# Patient Record
Sex: FEMALE | Race: WHITE | HISPANIC OR LATINO
[De-identification: names, ages, dates, MRNs, and addresses within clinical notes are randomized per-mention and may not be internally consistent; named-entity substitution may affect disease eponyms.]

---

## 2018-08-07 DIAGNOSIS — Z80.41 FAMILY HISTORY OF MALIGNANT NEOPLASM OF OVARY: ICD-10-CM

## 2018-08-07 DIAGNOSIS — N83.209 UNSPECIFIED OVARIAN CYST, UNSPECIFIED SIDE: ICD-10-CM

## 2018-08-07 DIAGNOSIS — Z81.8 FAMILY HISTORY OF OTHER MENTAL AND BEHAVIORAL DISORDERS: ICD-10-CM

## 2018-08-07 DIAGNOSIS — Z80.1 FAMILY HISTORY OF MALIGNANT NEOPLASM OF TRACHEA, BRONCHUS AND LUNG: ICD-10-CM

## 2018-08-07 DIAGNOSIS — Z80.0 FAMILY HISTORY OF MALIGNANT NEOPLASM OF DIGESTIVE ORGANS: ICD-10-CM

## 2018-08-07 DIAGNOSIS — Z80.3 FAMILY HISTORY OF MALIGNANT NEOPLASM OF BREAST: ICD-10-CM

## 2018-08-07 DIAGNOSIS — Z82.5 FAMILY HISTORY OF ASTHMA AND OTHER CHRONIC LOWER RESPIRATORY DISEASES: ICD-10-CM

## 2018-08-07 RX ORDER — DEXTRAN 70/HYPROMELLOSE 0.1%-0.3%
0.1-0.3 DROPS OPHTHALMIC (EYE)
Refills: 0 | Status: ACTIVE | COMMUNITY
Start: 2018-08-07

## 2018-09-04 ENCOUNTER — APPOINTMENT (OUTPATIENT)
Dept: INTERNAL MEDICINE | Facility: CLINIC | Age: 38
End: 2018-09-04

## 2018-11-27 ENCOUNTER — APPOINTMENT (OUTPATIENT)
Dept: INTERNAL MEDICINE | Facility: CLINIC | Age: 38
End: 2018-11-27
Payer: COMMERCIAL

## 2018-11-27 VITALS
DIASTOLIC BLOOD PRESSURE: 78 MMHG | BODY MASS INDEX: 17.33 KG/M2 | HEIGHT: 65 IN | WEIGHT: 104 LBS | OXYGEN SATURATION: 99 % | SYSTOLIC BLOOD PRESSURE: 90 MMHG | HEART RATE: 54 BPM | TEMPERATURE: 98.2 F

## 2018-11-27 DIAGNOSIS — Z82.49 FAMILY HISTORY OF ISCHEMIC HEART DISEASE AND OTHER DISEASES OF THE CIRCULATORY SYSTEM: ICD-10-CM

## 2018-11-27 DIAGNOSIS — Z83.438 FAMILY HISTORY OF OTHER DISORDER OF LIPOPROTEIN METABOLISM AND OTHER LIPIDEMIA: ICD-10-CM

## 2018-11-27 DIAGNOSIS — G43.909 MIGRAINE, UNSPECIFIED, NOT INTRACTABLE, W/OUT STATUS MIGRAINOSUS: ICD-10-CM

## 2018-11-27 DIAGNOSIS — H04.129 DRY EYE SYNDROME OF UNSPECIFIED LACRIMAL GLAND: ICD-10-CM

## 2018-11-27 LAB
BILIRUB UR QL STRIP: NORMAL
CLARITY UR: CLEAR
COLLECTION METHOD: NORMAL
GLUCOSE UR-MCNC: NORMAL
HCG UR QL: 0.2 EU/DL
HGB UR QL STRIP.AUTO: NORMAL
KETONES UR-MCNC: NORMAL
LEUKOCYTE ESTERASE UR QL STRIP: NORMAL
NITRITE UR QL STRIP: NORMAL
PH UR STRIP: 7.5
PROT UR STRIP-MCNC: NORMAL
SP GR UR STRIP: 1.01

## 2018-11-27 PROCEDURE — 81003 URINALYSIS AUTO W/O SCOPE: CPT | Mod: QW

## 2018-11-27 PROCEDURE — 99395 PREV VISIT EST AGE 18-39: CPT | Mod: 25

## 2018-11-27 NOTE — HEALTH RISK ASSESSMENT
[Very Good] : ~his/her~  mood as very good [No falls in past year] : Patient reported no falls in the past year [0] : 2) Feeling down, depressed, or hopeless: Not at all (0) [HIV Test offered] : HIV Test offered [Hepatitis C test offered] : Hepatitis C test offered [None] : None [With Family] : lives with family [# of Members in Household ___] :  household currently consist of [unfilled] member(s) [Employed] : employed [College] : College [] :  [# Of Children ___] : has [unfilled] children [Feels Safe at Home] : Feels safe at home [Fully functional (bathing, dressing, toileting, transferring, walking, feeding)] : Fully functional (bathing, dressing, toileting, transferring, walking, feeding) [Fully functional (using the telephone, shopping, preparing meals, housekeeping, doing laundry, using] : Fully functional and needs no help or supervision to perform IADLs (using the telephone, shopping, preparing meals, housekeeping, doing laundry, using transportation, managing medications and managing finances) [Smoke Detector] : smoke detector [Carbon Monoxide Detector] : carbon monoxide detector [Seat Belt] :  uses seat belt [] : No [Change in mental status noted] : No change in mental status noted [Reports changes in hearing] : Reports no changes in hearing [Reports changes in vision] : Reports no changes in vision [Reports changes in dental health] : Reports no changes in dental health [PapSmearDate] : 6/2018 [ColonoscopyDate] : 2009 [ColonoscopyComments] : EGD - 2009, SBS - 2009 [de-identified] : eye exam - 7/2018 [de-identified] : dentist - 7/2018

## 2018-11-27 NOTE — HISTORY OF PRESENT ILLNESS
[de-identified] : Pt presented for PE.  Last PE was 12/2016.  \par \par She c/o a white pimple? for about a week, and it doesn't hurt.\par \par She also c/o rash under both arms, she went to urgent care center, and was given a topical antifungal.  It works well but usually responds to the treatment.\par \par Pt declined Flu vaccine.

## 2018-11-27 NOTE — PHYSICAL EXAM
[No Acute Distress] : no acute distress [Well Nourished] : well nourished [Well Developed] : well developed [Normal Sclera/Conjunctiva] : normal sclera/conjunctiva [PERRL] : pupils equal round and reactive to light [EOMI] : extraocular movements intact [Normal Outer Ear/Nose] : the outer ears and nose were normal in appearance [Normal Oropharynx] : the oropharynx was normal [Normal TMs] : both tympanic membranes were normal [Normal Nasal Mucosa] : the nasal mucosa was normal [No JVD] : no jugular venous distention [Supple] : supple [No Lymphadenopathy] : no lymphadenopathy [No Respiratory Distress] : no respiratory distress  [Clear to Auscultation] : lungs were clear to auscultation bilaterally [Normal Rate] : normal rate  [Regular Rhythm] : with a regular rhythm [Normal S1, S2] : normal S1 and S2 [No Carotid Bruits] : no carotid bruits [Pedal Pulses Present] : the pedal pulses are present [No Edema] : there was no peripheral edema [No Extremity Clubbing/Cyanosis] : no extremity clubbing/cyanosis [Normal Appearance] : normal in appearance [No Masses] : no palpable masses [No Nipple Discharge] : no nipple discharge [No Axillary Lymphadenopathy] : no axillary lymphadenopathy [Soft] : abdomen soft [Non Tender] : non-tender [Non-distended] : non-distended [Normal Bowel Sounds] : normal bowel sounds [Normal Supraclavicular Nodes] : no supraclavicular lymphadenopathy [Normal Axillary Nodes] : no axillary lymphadenopathy [Normal Posterior Cervical Nodes] : no posterior cervical lymphadenopathy [Normal Anterior Cervical Nodes] : no anterior cervical lymphadenopathy [No CVA Tenderness] : no CVA  tenderness [No Spinal Tenderness] : no spinal tenderness [No Joint Swelling] : no joint swelling [Grossly Normal Strength/Tone] : grossly normal strength/tone [No Rash] : no rash [Normal Gait] : normal gait [Coordination Grossly Intact] : coordination grossly intact [No Focal Deficits] : no focal deficits [Speech Grossly Normal] : speech grossly normal [Normal Affect] : the affect was normal [Alert and Oriented x3] : oriented to person, place, and time [Normal Mood] : the mood was normal [de-identified] : Young female,

## 2018-11-27 NOTE — ASSESSMENT
[FreeTextEntry1] : Pt is doing well, and with lifestyle modification, she doesn't have much symptoms of IBS, migraine HA.  Her lipid panel is now in the normal range.  Pt actually gained some weight, her BMI was still low, but not as low as before.  Pt was encouraged to keep up with healthy lifestyle.\par \par She is UTD with Pap smear, eye exam and dental care.  She was reminded to have skin exam with dermatologist.

## 2018-11-27 NOTE — REVIEW OF SYSTEMS
[Dryness] : dryness  [Cough] : cough [Skin Rash] : skin rash [Negative] : Heme/Lymph [Fever] : no fever [Chills] : no chills [Fatigue] : no fatigue [Recent Change In Weight] : ~T no recent weight change [Chest Pain] : no chest pain [Palpitations] : no palpitations [Lower Ext Edema] : no lower extremity edema [Shortness Of Breath] : no shortness of breath [Wheezing] : no wheezing [Dyspnea on Exertion] : no dyspnea on exertion [Abdominal Pain] : no abdominal pain [Nausea] : no nausea [Constipation] : no constipation [Diarrhea] : diarrhea [Vomiting] : no vomiting [Heartburn] : no heartburn [Melena] : no melena [Dysuria] : no dysuria [Incontinence] : no incontinence [Nocturia] : no nocturia [Hematuria] : no hematuria [Joint Pain] : no joint pain [Joint Stiffness] : no joint stiffness [Joint Swelling] : no joint swelling [Muscle Weakness] : no muscle weakness [Muscle Pain] : no muscle pain [Back Pain] : no back pain [Itching] : no itching [Headache] : no headache [Dizziness] : no dizziness [Fainting] : no fainting [Insomnia] : no insomnia [Anxiety] : no anxiety [Depression] : no depression [Easy Bleeding] : no easy bleeding [Easy Bruising] : no easy bruising [Swollen Glands] : no swollen glands [FreeTextEntry4] : As in HPI, [de-identified] : rash as in HPI,

## 2018-11-27 NOTE — DATA REVIEWED
[FreeTextEntry1] : Derm - 2016\par \par Labs 11/13/2018 reviewed - \par * Chol - 179, HDL - 72, LDL - 94, TG - 44,\par * Vitamin D - 18,\par * the rest of labs were unremarkable.

## 2019-08-27 ENCOUNTER — APPOINTMENT (OUTPATIENT)
Dept: INTERNAL MEDICINE | Facility: CLINIC | Age: 39
End: 2019-08-27
Payer: COMMERCIAL

## 2019-08-27 VITALS
HEIGHT: 65 IN | BODY MASS INDEX: 17.16 KG/M2 | SYSTOLIC BLOOD PRESSURE: 100 MMHG | WEIGHT: 103 LBS | TEMPERATURE: 98.1 F | HEART RATE: 51 BPM | OXYGEN SATURATION: 99 % | DIASTOLIC BLOOD PRESSURE: 70 MMHG

## 2019-08-27 PROCEDURE — 99213 OFFICE O/P EST LOW 20 MIN: CPT

## 2019-08-27 RX ORDER — CHLORHEXIDINE GLUCONATE 4 %
LIQUID (ML) TOPICAL DAILY
Qty: 90 | Refills: 3 | Status: ACTIVE | COMMUNITY
Start: 2019-08-27

## 2019-08-27 NOTE — HISTORY OF PRESENT ILLNESS
[de-identified] : Pt presented for discussion of thyroid disease.  Her twin sister was diagnosed recently with ?hypothyroidism, she became concerned and wanted an evaluation.  \par \par She feels well without any new complaint.  No significant change of weight or energy level.  She is tired from working long hours, but feel better when her work schedule is lighter.  She denied change of appetite, bowel or  urinary habits.  She denied any change in hair, skin or nails.

## 2019-08-27 NOTE — ASSESSMENT
[FreeTextEntry1] : 38-year-old female presented today to discuss evaluation of thyroid condition. Her twin sisters was recently diagnosed with thyroid condition, probably hypothyroidism, and the patient became concerned. She denies any signs or symptoms of thyroid condition on review of system, except that she has been feeling tired, but she also has been working long hours. She stated that her fatigue improves if her working hours are shorter period\par \par On exam, vital signs are stable. The patient's BMI is low, but this is no change from her baseline for many years. The rest of the exams were unremarkable, there was no evidence of thyromegaly or thyroid nodule on exam.\par \par I assured the patient that she is unlikely to be having  an active thyroid condition at the moment. Her exam was unremarkable. I also informed patient that she has thyroid function test done on her routine lab work every year with a physical exam. Review of her blood tests from November of 2018 showed normal thyroid function tests.  I order routine blood tests for her, which she can do now if she is concerned or she can wait until her physical exam in a few months and do it prior to her office visit.\par \par I also asked her to obtain records of her twin's thyroid test results, and I will review it to see if we need to do any more testing.

## 2019-08-27 NOTE — PHYSICAL EXAM
[No Acute Distress] : no acute distress [Well Nourished] : well nourished [Well Developed] : well developed [No JVD] : no jugular venous distention [No Lymphadenopathy] : no lymphadenopathy [Supple] : supple [Thyroid Normal, No Nodules] : the thyroid was normal and there were no nodules present [No Respiratory Distress] : no respiratory distress  [Clear to Auscultation] : lungs were clear to auscultation bilaterally [Normal Rate] : normal rate  [Regular Rhythm] : with a regular rhythm [Normal S1, S2] : normal S1 and S2 [No Edema] : there was no peripheral edema [Soft] : abdomen soft [No Extremity Clubbing/Cyanosis] : no extremity clubbing/cyanosis [Non-distended] : non-distended [Normal Bowel Sounds] : normal bowel sounds [No Joint Swelling] : no joint swelling [Grossly Normal Strength/Tone] : grossly normal strength/tone [de-identified] : Thin female in stated age,

## 2019-12-10 ENCOUNTER — APPOINTMENT (OUTPATIENT)
Dept: INTERNAL MEDICINE | Facility: CLINIC | Age: 39
End: 2019-12-10
Payer: COMMERCIAL

## 2019-12-10 VITALS
SYSTOLIC BLOOD PRESSURE: 110 MMHG | OXYGEN SATURATION: 99 % | WEIGHT: 102 LBS | HEIGHT: 65 IN | HEART RATE: 52 BPM | DIASTOLIC BLOOD PRESSURE: 62 MMHG | BODY MASS INDEX: 17 KG/M2 | TEMPERATURE: 98.6 F

## 2019-12-10 DIAGNOSIS — E55.9 VITAMIN D DEFICIENCY, UNSPECIFIED: ICD-10-CM

## 2019-12-10 DIAGNOSIS — Z87.898 PERSONAL HISTORY OF OTHER SPECIFIED CONDITIONS: ICD-10-CM

## 2019-12-10 PROCEDURE — G0008: CPT

## 2019-12-10 PROCEDURE — 99395 PREV VISIT EST AGE 18-39: CPT | Mod: 25

## 2019-12-10 PROCEDURE — 90685 IIV4 VACC NO PRSV 0.25 ML IM: CPT

## 2019-12-10 RX ORDER — CHOLECALCIFEROL (VITAMIN D3) 50 MCG
50 MCG TABLET ORAL
Qty: 90 | Refills: 3 | Status: ACTIVE | COMMUNITY
Start: 2018-08-07

## 2019-12-10 NOTE — PAST MEDICAL HISTORY
[Menarche Age ____] : age at menarche was [unfilled] [Menstruating] : menstruating [Definite ___ (Date)] : the last menstrual period was [unfilled] [Regular Cycle Intervals] : have been regular [Normal Amount/Duration] : it was of a normal amount and duration [Total Preg ___] : G[unfilled] [Live Births ___] : P[unfilled]

## 2019-12-11 NOTE — HISTORY OF PRESENT ILLNESS
[de-identified] : Pt presented for PE.  Last PE was 1 year ago.  Her health was uneventful since her last visit, she has no new complaint. \par \par Pt would like to have Flu vaccine today.

## 2019-12-11 NOTE — PHYSICAL EXAM
[No Acute Distress] : no acute distress [Well Nourished] : well nourished [Well Developed] : well developed [Normal Sclera/Conjunctiva] : normal sclera/conjunctiva [PERRL] : pupils equal round and reactive to light [EOMI] : extraocular movements intact [Normal Outer Ear/Nose] : the outer ears and nose were normal in appearance [Normal Oropharynx] : the oropharynx was normal [Normal TMs] : both tympanic membranes were normal [Normal Nasal Mucosa] : the nasal mucosa was normal [No JVD] : no jugular venous distention [No Lymphadenopathy] : no lymphadenopathy [Supple] : supple [No Respiratory Distress] : no respiratory distress  [Clear to Auscultation] : lungs were clear to auscultation bilaterally [Normal Rate] : normal rate  [Normal S1, S2] : normal S1 and S2 [Regular Rhythm] : with a regular rhythm [No Carotid Bruits] : no carotid bruits [Pedal Pulses Present] : the pedal pulses are present [No Extremity Clubbing/Cyanosis] : no extremity clubbing/cyanosis [No Edema] : there was no peripheral edema [Normal Appearance] : normal in appearance [No Nipple Discharge] : no nipple discharge [No Axillary Lymphadenopathy] : no axillary lymphadenopathy [Soft] : abdomen soft [Non Tender] : non-tender [Non-distended] : non-distended [No Masses] : no abdominal mass palpated [No HSM] : no HSM [Normal Bowel Sounds] : normal bowel sounds [Normal Sphincter Tone] : normal sphincter tone [No Mass] : no mass [Normal Supraclavicular Nodes] : no supraclavicular lymphadenopathy [Normal Axillary Nodes] : no axillary lymphadenopathy [Normal Anterior Cervical Nodes] : no anterior cervical lymphadenopathy [Normal Posterior Cervical Nodes] : no posterior cervical lymphadenopathy [No CVA Tenderness] : no CVA  tenderness [No Joint Swelling] : no joint swelling [No Spinal Tenderness] : no spinal tenderness [No Rash] : no rash [Grossly Normal Strength/Tone] : grossly normal strength/tone [Coordination Grossly Intact] : coordination grossly intact [No Focal Deficits] : no focal deficits [Normal Gait] : normal gait [Speech Grossly Normal] : speech grossly normal [Normal Affect] : the affect was normal [Alert and Oriented x3] : oriented to person, place, and time [Normal Mood] : the mood was normal [Stool Occult Blood] : stool negative for occult blood [de-identified] : female in stated age,

## 2019-12-11 NOTE — REVIEW OF SYSTEMS
[Heartburn] : heartburn [Negative] : Heme/Lymph [Fever] : no fever [Chills] : no chills [Fatigue] : no fatigue [Recent Change In Weight] : ~T no recent weight change [Lower Ext Edema] : no lower extremity edema [Palpitations] : no palpitations [Chest Pain] : no chest pain [Shortness Of Breath] : no shortness of breath [Cough] : no cough [Wheezing] : no wheezing [Abdominal Pain] : no abdominal pain [Nausea] : no nausea [Dyspnea on Exertion] : no dyspnea on exertion [Diarrhea] : diarrhea [Melena] : no melena [Constipation] : no constipation [Vomiting] : no vomiting [Incontinence] : no incontinence [Dysuria] : no dysuria [Nocturia] : no nocturia [Joint Pain] : no joint pain [Hematuria] : no hematuria [Back Pain] : no back pain [Joint Stiffness] : no joint stiffness [Joint Swelling] : no joint swelling [Muscle Pain] : no muscle pain [Itching] : no itching [Mole Changes] : no mole changes [Dizziness] : no dizziness [Skin Rash] : no skin rash [Headache] : no headache [Unsteady Walking] : no ataxia [Insomnia] : no insomnia [Fainting] : no fainting [Depression] : no depression [Anxiety] : no anxiety [Easy Bleeding] : no easy bleeding [Easy Bruising] : no easy bruising [Swollen Glands] : no swollen glands [FreeTextEntry7] : occ GERD, very mild and infrequent,

## 2019-12-11 NOTE — ASSESSMENT
[FreeTextEntry1] : Patient is up-to-date with Pap smear.  She was reminded to have routine eye exam, dental care and skin exam with dermatologist.

## 2019-12-11 NOTE — HEALTH RISK ASSESSMENT
[Very Good] : ~his/her~ current health as very good [Excellent] : ~his/her~  mood as  excellent [0-5] : 0-5 [Never (0 pts)] : Never (0 points) [No falls in past year] : Patient reported no falls in the past year [No] : In the past 12 months have you used drugs other than those required for medical reasons? No [0] : 2) Feeling down, depressed, or hopeless: Not at all (0) [No Retinopathy] : No retinopathy [HIV Test offered] : HIV Test offered [Hepatitis C test offered] : Hepatitis C test offered [None] : None [With Family] : lives with family [# of Members in Household ___] :  household currently consist of [unfilled] member(s) [Employed] : employed [College] : College [] :  [# Of Children ___] : has [unfilled] children [Feels Safe at Home] : Feels safe at home [Fully functional (bathing, dressing, toileting, transferring, walking, feeding)] : Fully functional (bathing, dressing, toileting, transferring, walking, feeding) [Fully functional (using the telephone, shopping, preparing meals, housekeeping, doing laundry, using] : Fully functional and needs no help or supervision to perform IADLs (using the telephone, shopping, preparing meals, housekeeping, doing laundry, using transportation, managing medications and managing finances) [Smoke Detector] : smoke detector [Carbon Monoxide Detector] : carbon monoxide detector [Seat Belt] :  uses seat belt [] : No [Audit-CScore] : 0 [de-identified] : goes to the gym twice a week, very active, [[id number]] : 0 [EyeExamDate] : 03/19 [Change in mental status noted] : No change in mental status noted [Reports changes in hearing] : Reports no changes in hearing [Reports changes in vision] : Reports no changes in vision [Reports changes in dental health] : Reports no changes in dental health [PapSmearDate] : 06/19 [de-identified] : dentist - 08/19, 3 x per year

## 2019-12-11 NOTE — DATA REVIEWED
[FreeTextEntry1] : Pelvic US - 12/18\par Derm - 09/18\par \par Labs 12/3/2019 reviewed -\par * Chol - 177, HDL - 63, LDL - 95, TG - 98,\par * TSH - 4.76(H), the rest of TFTs were normal,\par * Vitamin D - 48,\par * the rest of labs were unremarkable.

## 2020-10-12 ENCOUNTER — APPOINTMENT (OUTPATIENT)
Dept: GASTROENTEROLOGY | Facility: CLINIC | Age: 40
End: 2020-10-12
Payer: COMMERCIAL

## 2020-10-12 VITALS
HEART RATE: 63 BPM | TEMPERATURE: 97.8 F | DIASTOLIC BLOOD PRESSURE: 70 MMHG | BODY MASS INDEX: 17.99 KG/M2 | OXYGEN SATURATION: 98 % | SYSTOLIC BLOOD PRESSURE: 100 MMHG | WEIGHT: 108 LBS | HEIGHT: 65 IN

## 2020-10-12 PROCEDURE — 99204 OFFICE O/P NEW MOD 45 MIN: CPT

## 2020-10-13 NOTE — REASON FOR VISIT
[Initial Evaluation] : an initial evaluation [FreeTextEntry1] : Epigastric pain, nausea, loose stools, heartburn

## 2020-10-13 NOTE — ASSESSMENT
[FreeTextEntry1] : Patient with 1-1/2 weeks of epigastric pain, heartburn, nausea.  This is been unresponsive to cimetidine, Tums, and Mylanta.  She has had multiple watery stools today only.\par \par Given the short-lived nature of the patient's symptoms, I will treat with pantoprazole 40 mg a day for 2 weeks and observe for resolution of her symptoms.\par \par Additionally, A list of dietary and lifestyle modifications in the treatment of GERD was given to the patient.\par \par Stool studies will be sent for a GI infectious PCR panel and C. diff by PCR.\par \par Patient will return to see me in 2 weeks to assess her response.  If her symptoms persist, we will pursue endoscopic evaluation.

## 2020-10-13 NOTE — CONSULT LETTER
[FreeTextEntry1] : Dear Dr. Shayla Perez,\[facility] \[facility] I had the pleasure of seeing your patient NICKY PARKER in the office today.  My office note is attached. PLEASE READ THE "ASSESSMENT" SECTION OF THE NOTE TO SEE MY IMPRESSION AND PLAN.\[facility] \[facility] Thank you very much for allowing me to participate in the care of your patient.\[facility] \[facility] Sincerely,\[facility] \[facility] Barry Beebe M.D., FAC, [facility] Director, Celiac Program at [facility]  of Medicine\[facility] and [employer] at [facility] Practice Director,[facility] - Gastroenterology/Internal Medicine at [place]\[facility] [address]\[place], [place] [address] Tel: [phone]\[facility] Email: [email or web address] \[facility] \[facility] The attached note has been created using a voice recognition system (Dragon).  There may be some misspellings and typos.  Please call my office if you have any issues or questions.

## 2020-10-13 NOTE — HISTORY OF PRESENT ILLNESS
[FreeTextEntry1] : The patient is a 39-year-old woman who has had epigastric pain and heartburn for the past 1-1/2 weeks.  She notes radiation of the pain up to her throat which is worse when she awakens in the morning.  She also notes nausea and a globus sensation.  She denies vomiting or dysphasia.  She has taken cimetidine 300 mg 3 times daily for the past 5 days without relief.  She typically has 3-4 solid bowel movements a day without melena or bright red blood per rectum.  Today, the patient has had multiple watery stools.  She denies any recent antibiotic use or travel.  The patient's weight is stable.  She denies any fevers, cough, shortness of breath.  She is also tried Tums and Mylanta without relief.  She reports having had a negative COVID swab on Wednesday.  The patient denies any aspirin or NSAID use.  The patient has not been admitted to the hospital in the past year and denies any cardiac issues.

## 2020-10-14 LAB
C DIFF TOX GENS STL QL NAA+PROBE: NORMAL
CDIFF BY PCR: DETECTED
GI PCR PANEL, STOOL: NORMAL

## 2020-10-29 ENCOUNTER — APPOINTMENT (OUTPATIENT)
Dept: GASTROENTEROLOGY | Facility: CLINIC | Age: 40
End: 2020-10-29
Payer: COMMERCIAL

## 2020-10-29 VITALS
HEIGHT: 65 IN | SYSTOLIC BLOOD PRESSURE: 100 MMHG | WEIGHT: 110 LBS | HEART RATE: 60 BPM | OXYGEN SATURATION: 100 % | BODY MASS INDEX: 18.33 KG/M2 | DIASTOLIC BLOOD PRESSURE: 80 MMHG | TEMPERATURE: 97.7 F

## 2020-10-29 DIAGNOSIS — Z20.828 CONTACT WITH AND (SUSPECTED) EXPOSURE TO OTHER VIRAL COMMUNICABLE DISEASES: ICD-10-CM

## 2020-10-29 DIAGNOSIS — Z01.818 ENCOUNTER FOR OTHER PREPROCEDURAL EXAMINATION: ICD-10-CM

## 2020-10-29 PROCEDURE — 99072 ADDL SUPL MATRL&STAF TM PHE: CPT

## 2020-10-29 PROCEDURE — 99214 OFFICE O/P EST MOD 30 MIN: CPT

## 2020-10-29 RX ORDER — CIMETIDINE 300 MG/1
300 TABLET, FILM COATED ORAL
Refills: 0 | Status: DISCONTINUED | COMMUNITY
End: 2020-10-29

## 2020-10-30 NOTE — HISTORY OF PRESENT ILLNESS
[FreeTextEntry1] : Following the patient's last visit, stool study showed the presence of C. difficile.  The patient was started on vancomycin 250 mg 4 times a day for 10 days, which she completed this morning.  The diarrhea went away within 2 days of the medication and the rectal burning has improved.  She now is having 2-3 solid bowel movements a day without melena or bright red blood per rectum.  She has not been taking the pantoprazole and continues to have heartburn and nausea.  She denies vomiting, dysphagia, or abdominal pain.  The patient has not been admitted to the hospital in the past year and denies any cardiac issues.\par \par \par Note from 10/12/2020 - The patient is a 39-year-old woman who has had epigastric pain and heartburn for the past 1-1/2 weeks.  She notes radiation of the pain up to her throat which is worse when she awakens in the morning.  She also notes nausea and a globus sensation.  She denies vomiting or dysphasia.  She has taken cimetidine 300 mg 3 times daily for the past 5 days without relief.  She typically has 3-4 solid bowel movements a day without melena or bright red blood per rectum.  Today, the patient has had multiple watery stools.  She denies any recent antibiotic use or travel.  The patient's weight is stable.  She denies any fevers, cough, shortness of breath.  She is also tried Tums and Mylanta without relief.  She reports having had a negative COVID swab on Wednesday.  The patient denies any aspirin or NSAID use.  The patient has not been admitted to the hospital in the past year and denies any cardiac issues.

## 2020-10-30 NOTE — CONSULT LETTER
[FreeTextEntry1] : Dear Dr. Shayla Perez,\[facility] \[facility] I had the pleasure of seeing your patient NICKY PARKER in the office today.  My office note is attached. PLEASE READ THE "ASSESSMENT" SECTION OF THE NOTE TO SEE MY IMPRESSION AND PLAN.\[facility] \[facility] Thank you very much for allowing me to participate in the care of your patient.\[facility] \[facility] Sincerely,\[facility] \[facility] Barry Beebe M.D., FAC, [facility] Director, Celiac Program at [facility]  of Medicine\[facility] and [employer] at [facility] Practice Director,[facility] - Gastroenterology/Internal Medicine at [employer]\[facility] [address]\[place], [place] [address] Tel: [phone]\[facility] Email: [email or web address] \[facility] \[facility] The attached note has been created using a voice recognition system (Dragon).  There may be some misspellings and typos.  Please call my office if you have any issues or questions.

## 2020-10-30 NOTE — ASSESSMENT
[FreeTextEntry1] : Patient found to have C. difficile diarrhea which has responded to vancomycin 250 mg 4 times a day for 10 days.  She continues to have heartburn and nausea.\par \par I counseled the patient regarding the possibility of relapse of the C. difficile.  The patient will contact me if her diarrhea returns.\par \par An EGD has been scheduled. The risks, benefits, alternatives, and limitations of the procedure were explained.\par \par Patient was advised to begin taking pantoprazole 40 mg a day.\par \par A list of dietary and lifestyle modifications in the treatment of GERD was given to the patient.\par \par \par Plan from 10/12/2020 - Patient with 1-1/2 weeks of epigastric pain, heartburn, nausea.  This is been unresponsive to cimetidine, Tums, and Mylanta.  She has had multiple watery stools today only.\par \par Given the short-lived nature of the patient's symptoms, I will treat with pantoprazole 40 mg a day for 2 weeks and observe for resolution of her symptoms.\par \par Additionally, A list of dietary and lifestyle modifications in the treatment of GERD was given to the patient.\par \par Stool studies will be sent for a GI infectious PCR panel and C. diff by PCR.\par \par Patient will return to see me in 2 weeks to assess her response.  If her symptoms persist, we will pursue endoscopic evaluation.

## 2020-11-06 ENCOUNTER — APPOINTMENT (OUTPATIENT)
Dept: GASTROENTEROLOGY | Facility: AMBULATORY MEDICAL SERVICES | Age: 40
End: 2020-11-06
Payer: COMMERCIAL

## 2020-11-06 PROCEDURE — 43239 EGD BIOPSY SINGLE/MULTIPLE: CPT

## 2020-11-23 ENCOUNTER — APPOINTMENT (OUTPATIENT)
Dept: GASTROENTEROLOGY | Facility: CLINIC | Age: 40
End: 2020-11-23
Payer: COMMERCIAL

## 2020-11-23 VITALS
SYSTOLIC BLOOD PRESSURE: 90 MMHG | BODY MASS INDEX: 17.99 KG/M2 | TEMPERATURE: 97.7 F | HEART RATE: 58 BPM | WEIGHT: 108 LBS | OXYGEN SATURATION: 99 % | HEIGHT: 65 IN | DIASTOLIC BLOOD PRESSURE: 72 MMHG

## 2020-11-23 DIAGNOSIS — K31.7 POLYP OF STOMACH AND DUODENUM: ICD-10-CM

## 2020-11-23 PROCEDURE — 99214 OFFICE O/P EST MOD 30 MIN: CPT

## 2020-11-23 RX ORDER — VANCOMYCIN HYDROCHLORIDE 250 MG/1
250 CAPSULE ORAL 4 TIMES DAILY
Qty: 40 | Refills: 0 | Status: DISCONTINUED | COMMUNITY
Start: 2020-10-14 | End: 2020-11-23

## 2020-11-24 NOTE — CONSULT LETTER
[FreeTextEntry1] : Dear Dr. Shayla Perez,\[facility] \[facility] I had the pleasure of seeing your patient NICKY PARKER in the office today.  My office note is attached. PLEASE READ THE "ASSESSMENT" SECTION OF THE NOTE TO SEE MY IMPRESSION AND PLAN.\[facility] \[facility] Thank you very much for allowing me to participate in the care of your patient.\[facility] \[facility] Sincerely,\[facility] \[facility] Barry Beebe M.D., FAC, [facility] Director, Celiac Program at [facility]  of Medicine\[facility] and [employer] at [facility] Practice Director,[facility] - Gastroenterology/Internal Medicine at [place]\[facility] [address] [facility] - [address]\[place], [place] [address] Tel: [phone]\[facility] Email: [email or web address] \[facility] \[facility] The attached note has been created using a voice recognition system (Dragon).  There may be some misspellings and typos.  Please call my office if you have any issues or questions.

## 2020-11-24 NOTE — HISTORY OF PRESENT ILLNESS
[FreeTextEntry1] : The patient completed a course of vancomycin for C. difficile infection on October 29, 2020.  She felt better and underwent EGD on November 6, 2020 which was significant only for a single benign fundic gland polyp in the fundus.  The patient was on pantoprazole 40 mg a day but stopped this 1-1/2 to 2 weeks ago.  She notices that she awakens in the morning with epigastric burning that lasts 30 to 60 minutes and then resolves.  The burning is relieved by drinking warm water.  She did not notice any significant change when she was on the pantoprazole.  She has been having 3-4 solid bowel movements a day ever since her cholecystectomy 20 years ago.  Today, the patient noticed rectal burning similar to what she felt when she had the C. difficile infection.  She denies melena or bright red blood per rectum.\par \par \par Note from 10/29/2020 - Following the patient's last visit, stool study showed the presence of C. difficile.  The patient was started on vancomycin 250 mg 4 times a day for 10 days, which she completed this morning.  The diarrhea went away within 2 days of the medication and the rectal burning has improved.  She now is having 2-3 solid bowel movements a day without melena or bright red blood per rectum.  She has not been taking the pantoprazole and continues to have heartburn and nausea.  She denies vomiting, dysphagia, or abdominal pain.  The patient has not been admitted to the hospital in the past year and denies any cardiac issues.\par \par \par Note from 10/12/2020 - The patient is a 39-year-old woman who has had epigastric pain and heartburn for the past 1-1/2 weeks.  She notes radiation of the pain up to her throat which is worse when she awakens in the morning.  She also notes nausea and a globus sensation.  She denies vomiting or dysphasia.  She has taken cimetidine 300 mg 3 times daily for the past 5 days without relief.  She typically has 3-4 solid bowel movements a day without melena or bright red blood per rectum.  Today, the patient has had multiple watery stools.  She denies any recent antibiotic use or travel.  The patient's weight is stable.  She denies any fevers, cough, shortness of breath.  She is also tried Tums and Mylanta without relief.  She reports having had a negative COVID swab on Wednesday.  The patient denies any aspirin or NSAID use.  The patient has not been admitted to the hospital in the past year and denies any cardiac issues.

## 2020-11-24 NOTE — ASSESSMENT
[FreeTextEntry1] : Patient with epigastric burning upon awakening in the morning.  EGD did not have any significant findings.  The patient had C. difficile last month which responded to vancomycin.  She does not have diarrhea but has begun to have rectal burning similar to when she had the C. difficile.\par \par Patient will restart pantoprazole 40 mg a day but will take this at night to see if this helps with her morning symptoms.\par \par Stool will be sent for C. difficile PCR given the patient's recurrent symptoms.\par \par \par Plan from 10/29/2020 - Patient found to have C. difficile diarrhea which has responded to vancomycin 250 mg 4 times a day for 10 days.  She continues to have heartburn and nausea.\par \par I counseled the patient regarding the possibility of relapse of the C. difficile.  The patient will contact me if her diarrhea returns.\par \par An EGD has been scheduled. The risks, benefits, alternatives, and limitations of the procedure were explained.\par \par Patient was advised to begin taking pantoprazole 40 mg a day.\par \par A list of dietary and lifestyle modifications in the treatment of GERD was given to the patient.\par \par \par Plan from 10/12/2020 - Patient with 1-1/2 weeks of epigastric pain, heartburn, nausea.  This is been unresponsive to cimetidine, Tums, and Mylanta.  She has had multiple watery stools today only.\par \par Given the short-lived nature of the patient's symptoms, I will treat with pantoprazole 40 mg a day for 2 weeks and observe for resolution of her symptoms.\par \par Additionally, A list of dietary and lifestyle modifications in the treatment of GERD was given to the patient.\par \par Stool studies will be sent for a GI infectious PCR panel and C. diff by PCR.\par \par Patient will return to see me in 2 weeks to assess her response.  If her symptoms persist, we will pursue endoscopic evaluation.

## 2020-12-01 LAB
C DIFF TOX GENS STL QL NAA+PROBE: NORMAL
CDIFF BY PCR: NOT DETECTED

## 2020-12-02 ENCOUNTER — NON-APPOINTMENT (OUTPATIENT)
Age: 40
End: 2020-12-02

## 2020-12-08 ENCOUNTER — APPOINTMENT (OUTPATIENT)
Dept: INTERNAL MEDICINE | Facility: CLINIC | Age: 40
End: 2020-12-08
Payer: COMMERCIAL

## 2020-12-08 VITALS
WEIGHT: 110 LBS | HEART RATE: 56 BPM | TEMPERATURE: 98.9 F | DIASTOLIC BLOOD PRESSURE: 73 MMHG | OXYGEN SATURATION: 100 % | SYSTOLIC BLOOD PRESSURE: 108 MMHG | HEIGHT: 65 IN | BODY MASS INDEX: 18.33 KG/M2

## 2020-12-08 VITALS — SYSTOLIC BLOOD PRESSURE: 112 MMHG | DIASTOLIC BLOOD PRESSURE: 62 MMHG

## 2020-12-08 DIAGNOSIS — Z23 ENCOUNTER FOR IMMUNIZATION: ICD-10-CM

## 2020-12-08 PROCEDURE — 99395 PREV VISIT EST AGE 18-39: CPT | Mod: 25

## 2020-12-08 PROCEDURE — 90686 IIV4 VACC NO PRSV 0.5 ML IM: CPT

## 2020-12-08 PROCEDURE — 99072 ADDL SUPL MATRL&STAF TM PHE: CPT

## 2020-12-08 PROCEDURE — G0008: CPT

## 2020-12-08 RX ORDER — DOXYCYCLINE HYCLATE 100 MG/1
100 CAPSULE ORAL
Qty: 14 | Refills: 0 | Status: COMPLETED | COMMUNITY
Start: 2020-08-02 | End: 2020-12-08

## 2020-12-08 RX ORDER — CLINDAMYCIN HYDROCHLORIDE 300 MG/1
300 CAPSULE ORAL
Qty: 30 | Refills: 0 | Status: COMPLETED | COMMUNITY
Start: 2020-08-04 | End: 2020-12-08

## 2020-12-08 NOTE — PHYSICAL EXAM
[No Acute Distress] : no acute distress [Well Nourished] : well nourished [Well Developed] : well developed [Normal Sclera/Conjunctiva] : normal sclera/conjunctiva [PERRL] : pupils equal round and reactive to light [EOMI] : extraocular movements intact [Normal Outer Ear/Nose] : the outer ears and nose were normal in appearance [Normal Oropharynx] : the oropharynx was normal [Normal TMs] : both tympanic membranes were normal [Normal Nasal Mucosa] : the nasal mucosa was normal [No JVD] : no jugular venous distention [No Lymphadenopathy] : no lymphadenopathy [Supple] : supple [No Respiratory Distress] : no respiratory distress  [Clear to Auscultation] : lungs were clear to auscultation bilaterally [Normal Rate] : normal rate  [Regular Rhythm] : with a regular rhythm [Normal S1, S2] : normal S1 and S2 [No Carotid Bruits] : no carotid bruits [Pedal Pulses Present] : the pedal pulses are present [No Edema] : there was no peripheral edema [No Extremity Clubbing/Cyanosis] : no extremity clubbing/cyanosis [Normal Appearance] : normal in appearance [No Nipple Discharge] : no nipple discharge [No Axillary Lymphadenopathy] : no axillary lymphadenopathy [Soft] : abdomen soft [Non Tender] : non-tender [Non-distended] : non-distended [No Masses] : no abdominal mass palpated [No HSM] : no HSM [Normal Bowel Sounds] : normal bowel sounds [Normal Sphincter Tone] : normal sphincter tone [No Mass] : no mass [Normal Supraclavicular Nodes] : no supraclavicular lymphadenopathy [Normal Axillary Nodes] : no axillary lymphadenopathy [Normal Posterior Cervical Nodes] : no posterior cervical lymphadenopathy [Normal Anterior Cervical Nodes] : no anterior cervical lymphadenopathy [No CVA Tenderness] : no CVA  tenderness [No Spinal Tenderness] : no spinal tenderness [No Joint Swelling] : no joint swelling [Grossly Normal Strength/Tone] : grossly normal strength/tone [No Rash] : no rash [Coordination Grossly Intact] : coordination grossly intact [No Focal Deficits] : no focal deficits [Normal Gait] : normal gait [Speech Grossly Normal] : speech grossly normal [Normal Affect] : the affect was normal [Alert and Oriented x3] : oriented to person, place, and time [Normal Mood] : the mood was normal [Stool Occult Blood] : stool negative for occult blood [de-identified] : female in stated age,

## 2020-12-08 NOTE — PAST MEDICAL HISTORY
[Menstruating] : menstruating [Menarche Age ____] : age at menarche was [unfilled] [Definite ___ (Date)] : the last menstrual period was [unfilled] [Total Preg ___] : G[unfilled] [Live Births ___] : P[unfilled]

## 2020-12-08 NOTE — DATA REVIEWED
[FreeTextEntry1] : Pelvic US - 12/18\par Derm - 09/18\par \par Labs 11/30/2020 reviewed -\par * Chol - 176, HDL - 66, LDL - 96, TG - 48,\par * TSH - 3.15, the rest of TFTs were normal,\par * the rest of labs were unremarkable.

## 2020-12-08 NOTE — HISTORY OF PRESENT ILLNESS
[de-identified] : Pt presented for PE.  Last PE was 1 year ago.  His/Her health was uneventful since last visit, he/she has no new complaint. \par \par Pt was well and did not get sick throughout the COVID pandemic. \par \par Pt was bit by a dog in 8/2020, and was prescribed ABx at the urgent care center, and it got better after that.\par \par Pt also saw GI recently for GI upset, and EGD was unremarkable.  She was treated with Pantoprazole, and felt better when her stress level went down.  She is now only on a OTC natural products.\par \par Pt also had diarrhea in 10/2020 and was positive for C diff, and was treated.  Retesting after that was negative.\par \par Pt would like to get flu vaccine today.

## 2020-12-08 NOTE — HEALTH RISK ASSESSMENT
[Very Good] : ~his/her~ current health as very good [Good] : ~his/her~  mood as  good [Never (0 pts)] : Never (0 points) [No] : In the past 12 months have you used drugs other than those required for medical reasons? No [No falls in past year] : Patient reported no falls in the past year [0] : 2) Feeling down, depressed, or hopeless: Not at all (0) [None] : None [With Family] : lives with family [# of Members in Household ___] :  household currently consist of [unfilled] member(s) [Employed] : employed [College] : College [] :  [# Of Children ___] : has [unfilled] children [Feels Safe at Home] : Feels safe at home [Fully functional (bathing, dressing, toileting, transferring, walking, feeding)] : Fully functional (bathing, dressing, toileting, transferring, walking, feeding) [Fully functional (using the telephone, shopping, preparing meals, housekeeping, doing laundry, using] : Fully functional and needs no help or supervision to perform IADLs (using the telephone, shopping, preparing meals, housekeeping, doing laundry, using transportation, managing medications and managing finances) [Smoke Detector] : smoke detector [Carbon Monoxide Detector] : carbon monoxide detector [Seat Belt] :  uses seat belt [] : No [Audit-CScore] : 0 [de-identified] : very active, training 2x per week. [ITL3Tiebr] : 0 [EyeExamDate] : 10/20 [Change in mental status noted] : No change in mental status noted [Reports changes in vision] : Reports no changes in vision [Reports changes in dental health] : Reports no changes in dental health [PapSmearDate] : 07/19 [ColonoscopyComments] : EGD - 11/20 [de-identified] : dentist - 11/2020

## 2020-12-08 NOTE — REVIEW OF SYSTEMS
[Heartburn] : heartburn [Negative] : Heme/Lymph [Dryness] : dryness  [Fever] : no fever [Chills] : no chills [Fatigue] : no fatigue [Recent Change In Weight] : ~T no recent weight change [Chest Pain] : no chest pain [Palpitations] : no palpitations [Lower Ext Edema] : no lower extremity edema [Shortness Of Breath] : no shortness of breath [Wheezing] : no wheezing [Cough] : no cough [Dyspnea on Exertion] : no dyspnea on exertion [Abdominal Pain] : no abdominal pain [Nausea] : no nausea [Constipation] : no constipation [Diarrhea] : diarrhea [Vomiting] : no vomiting [Melena] : no melena [Dysuria] : no dysuria [Incontinence] : no incontinence [Nocturia] : no nocturia [Hematuria] : no hematuria [Joint Pain] : no joint pain [Joint Stiffness] : no joint stiffness [Joint Swelling] : no joint swelling [Muscle Pain] : no muscle pain [Back Pain] : no back pain [Itching] : no itching [Mole Changes] : no mole changes [Skin Rash] : no skin rash [Headache] : no headache [Dizziness] : no dizziness [Fainting] : no fainting [Unsteady Walking] : no ataxia [Insomnia] : no insomnia [Anxiety] : no anxiety [Depression] : no depression [Easy Bleeding] : no easy bleeding [Easy Bruising] : no easy bruising [Swollen Glands] : no swollen glands [FreeTextEntry7] : occ GERD, very mild and infrequent, as in HPI,

## 2020-12-08 NOTE — ASSESSMENT
[FreeTextEntry1] : Patient is due for repeat Pap smear, and needs to follow-up with GYN.  She was reminded to have routine eye exam and dental care.

## 2021-01-22 ENCOUNTER — APPOINTMENT (OUTPATIENT)
Dept: GASTROENTEROLOGY | Facility: CLINIC | Age: 41
End: 2021-01-22

## 2021-06-01 ENCOUNTER — NON-APPOINTMENT (OUTPATIENT)
Age: 41
End: 2021-06-01

## 2021-06-08 ENCOUNTER — APPOINTMENT (OUTPATIENT)
Dept: INTERNAL MEDICINE | Facility: CLINIC | Age: 41
End: 2021-06-08
Payer: COMMERCIAL

## 2021-06-08 ENCOUNTER — NON-APPOINTMENT (OUTPATIENT)
Age: 41
End: 2021-06-08

## 2021-06-08 VITALS
DIASTOLIC BLOOD PRESSURE: 60 MMHG | HEART RATE: 58 BPM | OXYGEN SATURATION: 98 % | BODY MASS INDEX: 17.49 KG/M2 | TEMPERATURE: 98.2 F | WEIGHT: 105 LBS | SYSTOLIC BLOOD PRESSURE: 94 MMHG | HEIGHT: 65 IN

## 2021-06-08 DIAGNOSIS — Z00.00 ENCOUNTER FOR GENERAL ADULT MEDICAL EXAMINATION W/OUT ABNORMAL FINDINGS: ICD-10-CM

## 2021-06-08 DIAGNOSIS — R79.89 OTHER SPECIFIED ABNORMAL FINDINGS OF BLOOD CHEMISTRY: ICD-10-CM

## 2021-06-08 PROCEDURE — 99213 OFFICE O/P EST LOW 20 MIN: CPT

## 2021-06-08 PROCEDURE — 99072 ADDL SUPL MATRL&STAF TM PHE: CPT

## 2021-06-09 PROBLEM — Z00.00 ENCOUNTER FOR PREVENTIVE HEALTH EXAMINATION: Status: RESOLVED | Noted: 2018-11-27 | Resolved: 2021-06-09

## 2021-06-09 PROBLEM — R79.89 ABNORMAL THYROID BLOOD TEST: Status: RESOLVED | Noted: 2019-12-10 | Resolved: 2021-06-09

## 2021-06-09 NOTE — PHYSICAL EXAM
[No Acute Distress] : no acute distress [Well Nourished] : well nourished [Well Developed] : well developed [Supple] : supple [No Respiratory Distress] : no respiratory distress  [Clear to Auscultation] : lungs were clear to auscultation bilaterally [Normal Rate] : normal rate  [Regular Rhythm] : with a regular rhythm [Normal S1, S2] : normal S1 and S2 [No Edema] : there was no peripheral edema [Normal Supraclavicular Nodes] : no supraclavicular lymphadenopathy [Normal Posterior Cervical Nodes] : no posterior cervical lymphadenopathy [Normal Anterior Cervical Nodes] : no anterior cervical lymphadenopathy [No CVA Tenderness] : no CVA  tenderness [Grossly Normal Strength/Tone] : grossly normal strength/tone [Speech Grossly Normal] : speech grossly normal [Normal Affect] : the affect was normal [Alert and Oriented x3] : oriented to person, place, and time [Normal Mood] : the mood was normal [de-identified] : female in stated age,  [de-identified] : R ankle with minimal edema, and has good ROM

## 2021-06-09 NOTE — HISTORY OF PRESENT ILLNESS
[FreeTextEntry1] : PT presented for f/u of GERD. [de-identified] : Pt had GERD a few months ago, and saw GI.  She had EGD, and that was unremarkable.  She was treated with Pantoprazole, and that did not help much. Pt has made some dietary changes.  She continues to have a burning sensation that comes up to the chest.  She started herself on apple cider vinegar, and that actually helped with her symptoms. \par \par She went to see a different GI MD, and was given Dexilant, but has not started it yet.  She presented today to discuss what can be done.\par \par Pt also had C diff diarrhea in 10/2020, and that was treated w/o any problem.\par \par Pt also fell down the stairs, and she sprained her ankle about 3 months ago.  The ankle swelling and pain has not subsided completely, but is much better.  She is now walking w/o any ACE bandage for support.

## 2022-03-31 ENCOUNTER — APPOINTMENT (OUTPATIENT)
Dept: INTERNAL MEDICINE | Facility: CLINIC | Age: 42
End: 2022-03-31
Payer: COMMERCIAL

## 2022-03-31 VITALS
HEIGHT: 65 IN | HEART RATE: 63 BPM | TEMPERATURE: 98.9 F | DIASTOLIC BLOOD PRESSURE: 71 MMHG | BODY MASS INDEX: 18.16 KG/M2 | SYSTOLIC BLOOD PRESSURE: 106 MMHG | WEIGHT: 109 LBS | OXYGEN SATURATION: 100 %

## 2022-03-31 DIAGNOSIS — Z87.828 PERSONAL HISTORY OF OTHER (HEALED) PHYSICAL INJURY AND TRAUMA: ICD-10-CM

## 2022-03-31 DIAGNOSIS — S93.401A SPRAIN OF UNSPECIFIED LIGAMENT OF RIGHT ANKLE, INITIAL ENCOUNTER: ICD-10-CM

## 2022-03-31 PROCEDURE — 99213 OFFICE O/P EST LOW 20 MIN: CPT

## 2022-03-31 RX ORDER — ZINC OXIDE 13 %
CREAM (GRAM) TOPICAL
Refills: 0 | Status: ACTIVE | COMMUNITY
Start: 2022-03-31

## 2022-03-31 RX ORDER — DEXLANSOPRAZOLE 60 MG/1
60 CAPSULE, DELAYED RELEASE ORAL
Qty: 30 | Refills: 0 | Status: COMPLETED | COMMUNITY
Start: 2021-06-09 | End: 2022-03-31

## 2022-04-01 PROBLEM — Z87.828 HISTORY OF SPRAIN OF ANKLE: Status: RESOLVED | Noted: 2021-06-09 | Resolved: 2022-04-01

## 2022-04-01 NOTE — PHYSICAL EXAM
[No Acute Distress] : no acute distress [Well Nourished] : well nourished [Well Developed] : well developed [Supple] : supple [No Respiratory Distress] : no respiratory distress  [Clear to Auscultation] : lungs were clear to auscultation bilaterally [Normal Rate] : normal rate  [Regular Rhythm] : with a regular rhythm [Normal S1, S2] : normal S1 and S2 [No Edema] : there was no peripheral edema [Soft] : abdomen soft [Non Tender] : non-tender [Normal Bowel Sounds] : normal bowel sounds [No CVA Tenderness] : no CVA  tenderness [No Joint Swelling] : no joint swelling [Grossly Normal Strength/Tone] : grossly normal strength/tone [Speech Grossly Normal] : speech grossly normal [Normal Affect] : the affect was normal [Alert and Oriented x3] : oriented to person, place, and time [Normal Mood] : the mood was normal [de-identified] : female in stated age,  [de-identified] : Pt has mild edema and very faint bruising and tenderness in R pretibial area, minimally warmer, but no erythema, no calf tenderness, no Fidencio's isgn

## 2022-04-01 NOTE — HISTORY OF PRESENT ILLNESS
[Musculoskeletal Symptoms Legs] : leg [___ Days ago] : [unfilled] days ago [Moderate] : moderate [At Night] : at night [Stable] : stable [FreeTextEntry7] : pain and a little bit of pins and needles, noted some bruising in the pretibial area. 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  Some of the thorax probably bumped into her causing the bruise.  She just wanted to have an exam to make sure that she is not dealing with something dangerous.

## 2022-06-22 ENCOUNTER — LABORATORY RESULT (OUTPATIENT)
Age: 42
End: 2022-06-22

## 2022-06-22 LAB
APPEARANCE: CLEAR
APTT BLD: 35.5 SEC
BASOPHILS # BLD AUTO: 0.03 K/UL
BASOPHILS NFR BLD AUTO: 0.8 %
BILIRUBIN URINE: NEGATIVE
BLOOD URINE: NORMAL
COLOR: YELLOW
EOSINOPHIL # BLD AUTO: 0.12 K/UL
EOSINOPHIL NFR BLD AUTO: 3.1 %
GLUCOSE QUALITATIVE U: NEGATIVE
HCT VFR BLD CALC: 38.9 %
HGB BLD-MCNC: 13 G/DL
IMM GRANULOCYTES NFR BLD AUTO: 0 %
INR PPP: 1.05 RATIO
KETONES URINE: NEGATIVE
LEUKOCYTE ESTERASE URINE: NEGATIVE
LYMPHOCYTES # BLD AUTO: 0.99 K/UL
LYMPHOCYTES NFR BLD AUTO: 25.6 %
MAN DIFF?: NORMAL
MCHC RBC-ENTMCNC: 30.2 PG
MCHC RBC-ENTMCNC: 33.4 GM/DL
MCV RBC AUTO: 90.5 FL
MONOCYTES # BLD AUTO: 0.56 K/UL
MONOCYTES NFR BLD AUTO: 14.5 %
NEUTROPHILS # BLD AUTO: 2.17 K/UL
NEUTROPHILS NFR BLD AUTO: 56 %
NITRITE URINE: NEGATIVE
PH URINE: 7
PLATELET # BLD AUTO: 246 K/UL
PROTEIN URINE: NEGATIVE
PT BLD: 12.3 SEC
RBC # BLD: 4.3 M/UL
RBC # FLD: 12.3 %
SPECIFIC GRAVITY URINE: 1.02
UROBILINOGEN URINE: NORMAL
WBC # FLD AUTO: 3.87 K/UL

## 2022-06-23 LAB
ALBUMIN SERPL ELPH-MCNC: 4.4 G/DL
ALP BLD-CCNC: 59 U/L
ALT SERPL-CCNC: 21 U/L
ANION GAP SERPL CALC-SCNC: 14 MMOL/L
AST SERPL-CCNC: 17 U/L
BILIRUB SERPL-MCNC: 0.5 MG/DL
BUN SERPL-MCNC: 13 MG/DL
CALCIUM SERPL-MCNC: 9.7 MG/DL
CHLORIDE SERPL-SCNC: 106 MMOL/L
CHOLEST SERPL-MCNC: 177 MG/DL
CO2 SERPL-SCNC: 20 MMOL/L
CREAT SERPL-MCNC: 0.56 MG/DL
EGFR: 119 ML/MIN/1.73M2
GLUCOSE SERPL-MCNC: 95 MG/DL
HDLC SERPL-MCNC: 67 MG/DL
LDLC SERPL CALC-MCNC: 99 MG/DL
NONHDLC SERPL-MCNC: 110 MG/DL
POTASSIUM SERPL-SCNC: 4.2 MMOL/L
PROT SERPL-MCNC: 7 G/DL
SODIUM SERPL-SCNC: 140 MMOL/L
T3RU NFR SERPL: 0.7 TBI
T4 FREE SERPL-MCNC: 2.3 NG/DL
TRIGL SERPL-MCNC: 53 MG/DL
TSH SERPL-ACNC: <0.01 UIU/ML

## 2022-06-28 ENCOUNTER — APPOINTMENT (OUTPATIENT)
Dept: INTERNAL MEDICINE | Facility: CLINIC | Age: 42
End: 2022-06-28
Payer: COMMERCIAL

## 2022-06-28 VITALS
OXYGEN SATURATION: 98 % | HEART RATE: 71 BPM | WEIGHT: 107 LBS | DIASTOLIC BLOOD PRESSURE: 67 MMHG | TEMPERATURE: 98.1 F | RESPIRATION RATE: 15 BRPM | HEIGHT: 65 IN | BODY MASS INDEX: 17.83 KG/M2 | SYSTOLIC BLOOD PRESSURE: 100 MMHG

## 2022-06-28 DIAGNOSIS — Z87.898 PERSONAL HISTORY OF OTHER SPECIFIED CONDITIONS: ICD-10-CM

## 2022-06-28 DIAGNOSIS — Z87.19 PERSONAL HISTORY OF OTHER DISEASES OF THE DIGESTIVE SYSTEM: ICD-10-CM

## 2022-06-28 DIAGNOSIS — M79.604 PAIN IN RIGHT LEG: ICD-10-CM

## 2022-06-28 DIAGNOSIS — R20.8 OTHER DISTURBANCES OF SKIN SENSATION: ICD-10-CM

## 2022-06-28 DIAGNOSIS — R19.5 OTHER FECAL ABNORMALITIES: ICD-10-CM

## 2022-06-28 DIAGNOSIS — Z83.49 FAMILY HISTORY OF OTHER ENDOCRINE, NUTRITIONAL AND METABOLIC DISEASES: ICD-10-CM

## 2022-06-28 DIAGNOSIS — R12 HEARTBURN: ICD-10-CM

## 2022-06-28 PROCEDURE — 99395 PREV VISIT EST AGE 18-39: CPT

## 2022-06-28 NOTE — ASSESSMENT
[FreeTextEntry1] : Pt is UTD with all HCM tests, and will f/u with GYN soon for Pap smear.  She was reminded to have routine eye exam, dental care and skin exam with dermatologist.

## 2022-06-28 NOTE — DATA REVIEWED
[FreeTextEntry1] : Pelvic US - 12/2021\par Derm - summer 2021\par \par Labs 11/30/2020 reviewed -\par * Chol - 176, HDL - 66, LDL - 96, TG - 48,\par * TSH - 3.15, the rest of TFTs were normal,\par * the rest of labs were unremarkable.

## 2022-06-28 NOTE — PHYSICAL EXAM
[No Acute Distress] : no acute distress [Well Nourished] : well nourished [Well Developed] : well developed [Normal Sclera/Conjunctiva] : normal sclera/conjunctiva [PERRL] : pupils equal round and reactive to light [EOMI] : extraocular movements intact [Normal Outer Ear/Nose] : the outer ears and nose were normal in appearance [Normal Oropharynx] : the oropharynx was normal [Normal TMs] : both tympanic membranes were normal [Normal Nasal Mucosa] : the nasal mucosa was normal [No JVD] : no jugular venous distention [No Lymphadenopathy] : no lymphadenopathy [Supple] : supple [No Respiratory Distress] : no respiratory distress  [Clear to Auscultation] : lungs were clear to auscultation bilaterally [Normal Rate] : normal rate  [Regular Rhythm] : with a regular rhythm [Normal S1, S2] : normal S1 and S2 [No Carotid Bruits] : no carotid bruits [Pedal Pulses Present] : the pedal pulses are present [No Edema] : there was no peripheral edema [No Extremity Clubbing/Cyanosis] : no extremity clubbing/cyanosis [Normal Appearance] : normal in appearance [No Masses] : no palpable masses [No Nipple Discharge] : no nipple discharge [No Axillary Lymphadenopathy] : no axillary lymphadenopathy [Soft] : abdomen soft [Non Tender] : non-tender [Non-distended] : non-distended [Normal Bowel Sounds] : normal bowel sounds [Normal Supraclavicular Nodes] : no supraclavicular lymphadenopathy [Normal Axillary Nodes] : no axillary lymphadenopathy [Normal Posterior Cervical Nodes] : no posterior cervical lymphadenopathy [Normal Anterior Cervical Nodes] : no anterior cervical lymphadenopathy [No CVA Tenderness] : no CVA  tenderness [No Spinal Tenderness] : no spinal tenderness [No Joint Swelling] : no joint swelling [Grossly Normal Strength/Tone] : grossly normal strength/tone [No Rash] : no rash [Coordination Grossly Intact] : coordination grossly intact [No Focal Deficits] : no focal deficits [Normal Gait] : normal gait [Speech Grossly Normal] : speech grossly normal [Normal Affect] : the affect was normal [Alert and Oriented x3] : oriented to person, place, and time [Normal Mood] : the mood was normal [de-identified] : female in stated age,  [de-identified] : Pt has mild edema and very faint bruising and tenderness in R pretibial area, minimally warmer, but no erythema, no calf tenderness, no Fidencio's isgn

## 2022-06-28 NOTE — HISTORY OF PRESENT ILLNESS
[FreeTextEntry1] : Pt presented for PE.  Last PE was 12/2020. [de-identified] : His/Her health was uneventful since last visit, he/she has no new complaint. \par \par Pt was well and did not get sick throughout the COVID pandemic.   She's had 2 doses of COVID vaccine.\par

## 2022-06-28 NOTE — REVIEW OF SYSTEMS
[Dryness] : dryness  [Negative] : Heme/Lymph [Fever] : no fever [Chills] : no chills [Fatigue] : no fatigue [Recent Change In Weight] : ~T no recent weight change [Chest Pain] : no chest pain [Palpitations] : no palpitations [Lower Ext Edema] : no lower extremity edema [Shortness Of Breath] : no shortness of breath [Wheezing] : no wheezing [Cough] : no cough [Dyspnea on Exertion] : no dyspnea on exertion [Abdominal Pain] : no abdominal pain [Nausea] : no nausea [Constipation] : no constipation [Diarrhea] : diarrhea [Vomiting] : no vomiting [Heartburn] : no heartburn [Melena] : no melena [Dysuria] : no dysuria [Incontinence] : no incontinence [Nocturia] : no nocturia [Hematuria] : no hematuria [Joint Pain] : no joint pain [Joint Stiffness] : no joint stiffness [Joint Swelling] : no joint swelling [Muscle Pain] : no muscle pain [Back Pain] : no back pain [Itching] : no itching [Mole Changes] : no mole changes [Skin Rash] : no skin rash [Headache] : no headache [Dizziness] : no dizziness [Fainting] : no fainting [Unsteady Walking] : no ataxia [Insomnia] : no insomnia [Anxiety] : no anxiety [Depression] : no depression [Easy Bleeding] : no easy bleeding [Easy Bruising] : no easy bruising [Swollen Glands] : no swollen glands

## 2022-06-28 NOTE — PAST MEDICAL HISTORY
[Menstruating] : menstruating [Menarche Age ____] : age at menarche was [unfilled] [Approximately ___] : the LMP was approximately [unfilled] [Normal Amount/Duration] : it was of a normal amount and duration [Regular Cycle Intervals] : have been regular [Total Preg ___] : G[unfilled] [Live Births ___] : P[unfilled]

## 2022-06-28 NOTE — HEALTH RISK ASSESSMENT
[Never] : Never [No] : In the past 12 months have you used drugs other than those required for medical reasons? No [None] : None [With Family] : lives with family [# of Members in Household ___] :  household currently consist of [unfilled] member(s) [Employed] : employed [College] : College [] :  [# Of Children ___] : has [unfilled] children [Feels Safe at Home] : Feels safe at home [Fully functional (bathing, dressing, toileting, transferring, walking, feeding)] : Fully functional (bathing, dressing, toileting, transferring, walking, feeding) [Fully functional (using the telephone, shopping, preparing meals, housekeeping, doing laundry, using] : Fully functional and needs no help or supervision to perform IADLs (using the telephone, shopping, preparing meals, housekeeping, doing laundry, using transportation, managing medications and managing finances) [Smoke Detector] : smoke detector [Carbon Monoxide Detector] : carbon monoxide detector [Seat Belt] :  uses seat belt [Very Good] : ~his/her~ current health as very good [Good] : ~his/her~  mood as  good [No falls in past year] : Patient reported no falls in the past year [0] : 2) Feeling down, depressed, or hopeless: Not at all (0) [PHQ-2 Negative - No further assessment needed] : PHQ-2 Negative - No further assessment needed [Audit-CScore] : 0 [de-identified] : very active at work, no formal exercise, [[id number]] : 0 [EyeExamDate] : 10/21 [Change in mental status noted] : No change in mental status noted [Reports changes in hearing] : Reports no changes in hearing [Reports changes in vision] : Reports no changes in vision [Reports changes in dental health] : Reports no changes in dental health [MammogramDate] : 11/21 [MammogramComments] : US breast -  11/2021 [PapSmearDate] : 07/21 [ColonoscopyComments] : EGD - 11/2020 [de-identified] : dentist - 3/2022, every 4 months

## 2022-08-23 ENCOUNTER — APPOINTMENT (OUTPATIENT)
Dept: INTERNAL MEDICINE | Facility: CLINIC | Age: 42
End: 2022-08-23

## 2022-08-23 ENCOUNTER — NON-APPOINTMENT (OUTPATIENT)
Age: 42
End: 2022-08-23

## 2022-08-23 VITALS
HEIGHT: 65 IN | TEMPERATURE: 98.7 F | SYSTOLIC BLOOD PRESSURE: 102 MMHG | OXYGEN SATURATION: 98 % | DIASTOLIC BLOOD PRESSURE: 65 MMHG | HEART RATE: 72 BPM | WEIGHT: 107 LBS | BODY MASS INDEX: 17.83 KG/M2

## 2022-08-23 DIAGNOSIS — L50.8 OTHER URTICARIA: ICD-10-CM

## 2022-08-23 PROCEDURE — 99213 OFFICE O/P EST LOW 20 MIN: CPT

## 2022-08-23 NOTE — HISTORY OF PRESENT ILLNESS
[Rash (Location) ___] : rash on [unfilled] [Moderate] : moderate [___ Days ago] : [unfilled] days ago [Constant] : constant [In Morning] : in the morning [Stable] : stable [de-identified] : all over the trunks and extremities [FreeTextEntry5] : Prednisone & Benadryl, [FreeTextEntry8] : Pt went to urgent care center and was placed on Prednisone 20 mg 2 tabs daily for 5 days, she took it for 2 days already, and she feels that it hasn't helped her.\par \par She denied any new meds, diet or change of household items.  \par \par She is on Methimazole for over a month before she had this hives, and endocrinologist feels that it's not the culprit for this rash.  She is off on all vitamins for now. \par \par She denied F/S/C, URI symptoms, N/V and eating and sleeping ok, no change of bowel or urinary habits.

## 2022-08-23 NOTE — PHYSICAL EXAM
[No Acute Distress] : no acute distress [Well Nourished] : well nourished [Well Developed] : well developed [Normal Sclera/Conjunctiva] : normal sclera/conjunctiva [EOMI] : extraocular movements intact [Normal Outer Ear/Nose] : the outer ears and nose were normal in appearance [Normal Oropharynx] : the oropharynx was normal [Normal TMs] : both tympanic membranes were normal [Normal Nasal Mucosa] : the nasal mucosa was normal [Supple] : supple [No Respiratory Distress] : no respiratory distress  [Clear to Auscultation] : lungs were clear to auscultation bilaterally [Normal Rate] : normal rate  [Regular Rhythm] : with a regular rhythm [Normal S1, S2] : normal S1 and S2 [No Edema] : there was no peripheral edema [Soft] : abdomen soft [Non Tender] : non-tender [Normal Bowel Sounds] : normal bowel sounds [Normal Supraclavicular Nodes] : no supraclavicular lymphadenopathy [Normal Posterior Cervical Nodes] : no posterior cervical lymphadenopathy [Normal Anterior Cervical Nodes] : no anterior cervical lymphadenopathy [No CVA Tenderness] : no CVA  tenderness [No Spinal Tenderness] : no spinal tenderness [No Joint Swelling] : no joint swelling [Grossly Normal Strength/Tone] : grossly normal strength/tone [Normal Gait] : normal gait [Speech Grossly Normal] : speech grossly normal [Normal Affect] : the affect was normal [Alert and Oriented x3] : oriented to person, place, and time [Normal Mood] : the mood was normal [de-identified] : female in stated age,  [de-identified] : Pt with multiple small urticarial rashes through out her trunk and extremities, even a couple of lesions on her face,

## 2022-10-31 DIAGNOSIS — R92.2 INCONCLUSIVE MAMMOGRAM: ICD-10-CM

## 2022-11-29 ENCOUNTER — APPOINTMENT (OUTPATIENT)
Dept: INTERNAL MEDICINE | Facility: CLINIC | Age: 42
End: 2022-11-29

## 2022-11-29 VITALS
HEART RATE: 65 BPM | OXYGEN SATURATION: 99 % | SYSTOLIC BLOOD PRESSURE: 127 MMHG | WEIGHT: 107 LBS | DIASTOLIC BLOOD PRESSURE: 80 MMHG | BODY MASS INDEX: 21.01 KG/M2 | HEIGHT: 60 IN | TEMPERATURE: 99 F

## 2022-11-29 VITALS — DIASTOLIC BLOOD PRESSURE: 72 MMHG | SYSTOLIC BLOOD PRESSURE: 112 MMHG

## 2022-11-29 PROCEDURE — 99213 OFFICE O/P EST LOW 20 MIN: CPT

## 2022-11-29 RX ORDER — METHIMAZOLE 10 MG/1
10 TABLET ORAL
Qty: 2 | Refills: 1 | Status: COMPLETED | COMMUNITY
Start: 2022-08-23 | End: 2022-11-29

## 2022-11-29 RX ORDER — PREDNISONE 20 MG/1
20 TABLET ORAL DAILY
Qty: 10 | Refills: 0 | Status: COMPLETED | COMMUNITY
Start: 2022-08-21 | End: 2022-11-29

## 2022-11-29 RX ORDER — HYDROXYZINE HYDROCHLORIDE 25 MG/1
25 TABLET ORAL 3 TIMES DAILY
Qty: 50 | Refills: 1 | Status: COMPLETED | COMMUNITY
Start: 2022-08-23 | End: 2022-11-29

## 2022-11-30 NOTE — PHYSICAL EXAM
[No Acute Distress] : no acute distress [Well Nourished] : well nourished [Well Developed] : well developed [Supple] : supple [No Respiratory Distress] : no respiratory distress  [Clear to Auscultation] : lungs were clear to auscultation bilaterally [Normal Rate] : normal rate  [Regular Rhythm] : with a regular rhythm [Normal S1, S2] : normal S1 and S2 [No Edema] : there was no peripheral edema [Soft] : abdomen soft [Non Tender] : non-tender [Non-distended] : non-distended [Normal Bowel Sounds] : normal bowel sounds [Normal Posterior Cervical Nodes] : no posterior cervical lymphadenopathy [Normal Anterior Cervical Nodes] : no anterior cervical lymphadenopathy [No CVA Tenderness] : no CVA  tenderness [No Spinal Tenderness] : no spinal tenderness [No Joint Swelling] : no joint swelling [Grossly Normal Strength/Tone] : grossly normal strength/tone [Normal Gait] : normal gait [Speech Grossly Normal] : speech grossly normal [Normal Affect] : the affect was normal [Alert and Oriented x3] : oriented to person, place, and time [Normal Mood] : the mood was normal [de-identified] : female in stated age,

## 2022-11-30 NOTE — HISTORY OF PRESENT ILLNESS
[FreeTextEntry1] : Pt presented for f/u of hyperthyroidism and f/u of previous C diff infection. [de-identified] : She has been seeing endocrinologist and was told she likely had Grave's Disease.  She was  initially treated with Methimazole, but had allergic reaction with a rash.  She was switched over to propylthiouracil 50 mg TID.  She is doing better on this meds and f/u labs showed improvement.  However, she find taking meds TID to be very inconvenient as she has to work, she will f/u with endocrine for further management.  Pt was also told by endo that her Grave's Disease could be triggered by COVID vaccine, she is now reluctant to go for the new bivalent COVID booster vaccine or Flu vaccine.\par \par Pt also had C diff infection in 2020, she saw GI and was treated, subsequent stool test was negative.  She was concerned about recurrence, but denied diarrhea or any GI discomfort.  Pt thought she had H pylori infection, but EGD and path was negative for H pylori in  11/2020.\par \par

## 2022-12-13 ENCOUNTER — APPOINTMENT (OUTPATIENT)
Dept: GASTROENTEROLOGY | Facility: CLINIC | Age: 42
End: 2022-12-13

## 2022-12-13 VITALS
BODY MASS INDEX: 20.54 KG/M2 | WEIGHT: 104.6 LBS | HEIGHT: 60 IN | HEART RATE: 67 BPM | OXYGEN SATURATION: 99 % | SYSTOLIC BLOOD PRESSURE: 124 MMHG | TEMPERATURE: 98.7 F | DIASTOLIC BLOOD PRESSURE: 84 MMHG

## 2022-12-13 DIAGNOSIS — A04.72 ENTEROCOLITIS DUE TO CLOSTRIDIUM DIFFICILE, NOT SPECIFIED AS RECURRENT: ICD-10-CM

## 2022-12-13 PROCEDURE — 99213 OFFICE O/P EST LOW 20 MIN: CPT

## 2022-12-13 RX ORDER — PANTOPRAZOLE 40 MG/1
40 TABLET, DELAYED RELEASE ORAL
Qty: 90 | Refills: 1 | Status: DISCONTINUED | COMMUNITY
Start: 2020-10-12 | End: 2022-12-13

## 2022-12-16 PROBLEM — A04.72 C. DIFFICILE DIARRHEA: Status: RESOLVED | Noted: 2020-10-14 | Resolved: 2020-12-08

## 2022-12-16 NOTE — ASSESSMENT
[FreeTextEntry1] : Patient with a history of C. difficile infection and a history of IBS.  She is currently doing very well with no symptoms.  She has no diarrhea.  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  She was advised to contact me if she develops diarrhea.\par \par Patient is due for colonoscopy at age 47 in November 2027.\par \par \par Plan from 11/23/2020 - Patient with epigastric burning upon awakening in the morning.  EGD did not have any significant findings.  The patient had C. difficile last month which responded to vancomycin.  She does not have diarrhea but has begun to have rectal burning similar to when she had the C. difficile.\par \par Patient will restart pantoprazole 40 mg a day but will take this at night to see if this helps with her morning symptoms.\par \par Stool will be sent for C. difficile PCR given the patient's recurrent symptoms.\par \par \par Plan from 10/29/2020 - Patient found to have C. difficile diarrhea which has responded to vancomycin 250 mg 4 times a day for 10 days.  She continues to have heartburn and nausea.\par \par I counseled the patient regarding the possibility of relapse of the C. difficile.  The patient will contact me if her diarrhea returns.\par \par An EGD has been scheduled. The risks, benefits, alternatives, and limitations of the procedure were explained.\par \par Patient was advised to begin taking pantoprazole 40 mg a day.\par \par A list of dietary and lifestyle modifications in the treatment of GERD was given to the patient.\par \par \par Plan from 10/12/2020 - Patient with 1-1/2 weeks of epigastric pain, heartburn, nausea.  This is been unresponsive to cimetidine, Tums, and Mylanta.  She has had multiple watery stools today only.\par \par Given the short-lived nature of the patient's symptoms, I will treat with pantoprazole 40 mg a day for 2 weeks and observe for resolution of her symptoms.\par \par Additionally, A list of dietary and lifestyle modifications in the treatment of GERD was given to the patient.\par \par Stool studies will be sent for a GI infectious PCR panel and C. diff by PCR.\par \par Patient will return to see me in 2 weeks to assess her response.  If her symptoms persist, we will pursue endoscopic evaluation.

## 2022-12-16 NOTE — HISTORY OF PRESENT ILLNESS
[FreeTextEntry1] : The patient has a history of IBS and a history of C. difficile.  I am seeing her for the first time in over 2 years.  She has been diagnosed with Graves' disease and is on propylthiouracil.  She feels well denying abdominal pain, heartburn, dysphagia, nausea, vomiting.  She reports 2-3 solid bowel movements a day.  She denies melena or bright red blood per rectum.  She states that in the past, when she gets stressed, she would get heartburn.  She has had no current symptoms.  She boards dogs and handles dogs stool and was concerned regarding the need to be checked for infections and also wanted to know if she needed to be checked for C. difficile.  The patient has not been admitted to the hospital in the past year and denies any cardiac issues.\par \par \par Note from 11/23/2020 - The patient completed a course of vancomycin for C. difficile infection on October 29, 2020.  She felt better and underwent EGD on November 6, 2020 which was significant only for a single benign fundic gland polyp in the fundus.  The patient was on pantoprazole 40 mg a day but stopped this 1-1/2 to 2 weeks ago.  She notices that she awakens in the morning with epigastric burning that lasts 30 to 60 minutes and then resolves.  The burning is relieved by drinking warm water.  She did not notice any significant change when she was on the pantoprazole.  She has been having 3-4 solid bowel movements a day ever since her cholecystectomy 20 years ago.  Today, the patient noticed rectal burning similar to what she felt when she had the C. difficile infection.  She denies melena or bright red blood per rectum.\par \par \par Note from 10/29/2020 - Following the patient's last visit, stool study showed the presence of C. difficile.  The patient was started on vancomycin 250 mg 4 times a day for 10 days, which she completed this morning.  The diarrhea went away within 2 days of the medication and the rectal burning has improved.  She now is having 2-3 solid bowel movements a day without melena or bright red blood per rectum.  She has not been taking the pantoprazole and continues to have heartburn and nausea.  She denies vomiting, dysphagia, or abdominal pain.  The patient has not been admitted to the hospital in the past year and denies any cardiac issues.\par \par \par Note from 10/12/2020 - The patient is a 39-year-old woman who has had epigastric pain and heartburn for the past 1-1/2 weeks.  She notes radiation of the pain up to her throat which is worse when she awakens in the morning.  She also notes nausea and a globus sensation.  She denies vomiting or dysphasia.  She has taken cimetidine 300 mg 3 times daily for the past 5 days without relief.  She typically has 3-4 solid bowel movements a day without melena or bright red blood per rectum.  Today, the patient has had multiple watery stools.  She denies any recent antibiotic use or travel.  The patient's weight is stable.  She denies any fevers, cough, shortness of breath.  She is also tried Tums and Mylanta without relief.  She reports having had a negative COVID swab on Wednesday.  The patient denies any aspirin or NSAID use.  The patient has not been admitted to the hospital in the past year and denies any cardiac issues.

## 2022-12-16 NOTE — CONSULT LETTER
[FreeTextEntry1] : Dear Dr. Shayla Perez,\par \par I had the pleasure of seeing your patient NICKY PARKER in the office today.  My office note is attached. PLEASE READ THE "ASSESSMENT" SECTION OF THE NOTE TO SEE MY IMPRESSION AND PLAN.\par \par Thank you very much for allowing me to participate in the care of your patient.\par \par Sincerely,\par \par Barry Beebe M.D., FAC, FACP\par Director, Celiac Program at [facility]\par  of Medicine, [employer] at [facility] Adjunct  of Medicine, [facility] Practice Director, [facility] - Gastroenterology at [place]\[facility] [address]\[place] [address]\par Tel: [phone]\par Email: [email or web address]\par \par \par The attached note has been created using a voice recognition system (Dragon).  There may be some misspellings and typos.  Please call my office if you have any issues or questions.

## 2023-01-10 ENCOUNTER — NON-APPOINTMENT (OUTPATIENT)
Age: 43
End: 2023-01-10

## 2023-01-10 ENCOUNTER — APPOINTMENT (OUTPATIENT)
Dept: ALLERGY | Facility: CLINIC | Age: 43
End: 2023-01-10
Payer: COMMERCIAL

## 2023-01-10 VITALS
HEIGHT: 65 IN | TEMPERATURE: 98.7 F | HEART RATE: 73 BPM | OXYGEN SATURATION: 99 % | WEIGHT: 105 LBS | BODY MASS INDEX: 17.49 KG/M2 | SYSTOLIC BLOOD PRESSURE: 98 MMHG | DIASTOLIC BLOOD PRESSURE: 64 MMHG

## 2023-01-10 PROCEDURE — 95004 PERQ TESTS W/ALRGNC XTRCS: CPT

## 2023-01-10 PROCEDURE — 99203 OFFICE O/P NEW LOW 30 MIN: CPT | Mod: 25

## 2023-01-10 PROCEDURE — 95018 ALL TSTG PERQ&IQ DRUGS/BIOL: CPT

## 2023-01-10 NOTE — ASSESSMENT
[FreeTextEntry1] : Perennial allergic rhinitis secondary to dust mite - dog - chronic exposure:\par \par Avoidance measures reviewed with patient\par \par Hyperthyroidism secondary to Grave's Disease - I have advised patient that her environmental allergies are not the cause for her hyperthyroidism.

## 2023-01-10 NOTE — SOCIAL HISTORY
[House] : [unfilled] lives in a house  [Dog] : dog [] :  [FreeTextEntry1] : Lives with spouse\par   [Smokers in Household] : there are no smokers in the home [de-identified] : x2

## 2023-01-10 NOTE — PHYSICAL EXAM
[Alert] : alert [Well Nourished] : well nourished [No Acute Distress] : no acute distress [Well Developed] : well developed [No Neck Mass] : no neck mass was observed [No LAD] : no lymphadenopathy [Supple] : the neck was supple [Normal Rate and Effort] : normal respiratory rhythm and effort [No Crackles] : no crackles [No Retractions] : no retractions [Normal Rate] : heart rate was normal  [Normal S1, S2] : normal S1 and S2 [Regular Rhythm] : with a regular rhythm [Normal Cervical Lymph Nodes] : cervical [No Rash] : no rash [No Cyanosis] : no cyanosis [Normal Mood] : mood was normal [Normal Affect] : affect was normal [Judgment and Insight Age Appropriate] : judgement and insight is age appropriate [Alert, Awake, Oriented as Age-Appropriate] : alert, awake, oriented as age appropriate [Wheezing] : no wheezing was heard

## 2023-01-10 NOTE — IMPRESSION
[Allergy Testing Cockroach] : cockroach [Allergy Testing Trees] : trees [Allergy Testing Weeds] : weeds [Allergy Testing Grasses] : grasses [_____] : molds ([unfilled])

## 2023-01-10 NOTE — HISTORY OF PRESENT ILLNESS
[de-identified] : Patient reports having a routine PE - found to be hyperthyroid - seen by endocrinologist and diagnosed with Grave's Disease - treated with methimazole - developed a rash and medications was changed to PTU - plan is to wait to see if her symptoms lessen over time.   Patient is trying to determine cause for Grave's disease.   Patient is followed by nutritionist and there was a concern about mold allergy contributing to her disease. \par \par Patient with Penicillin allergy as child \par \par Patient with recurrent sneezing with exposure to cats and dust.   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

## 2023-07-11 ENCOUNTER — NON-APPOINTMENT (OUTPATIENT)
Age: 43
End: 2023-07-11

## 2023-07-11 ENCOUNTER — APPOINTMENT (OUTPATIENT)
Dept: INTERNAL MEDICINE | Facility: CLINIC | Age: 43
End: 2023-07-11
Payer: COMMERCIAL

## 2023-07-11 VITALS
OXYGEN SATURATION: 98 % | DIASTOLIC BLOOD PRESSURE: 63 MMHG | TEMPERATURE: 98.2 F | HEART RATE: 71 BPM | SYSTOLIC BLOOD PRESSURE: 104 MMHG | HEIGHT: 65 IN | BODY MASS INDEX: 17.33 KG/M2 | WEIGHT: 104 LBS

## 2023-07-11 DIAGNOSIS — Z00.00 ENCOUNTER FOR GENERAL ADULT MEDICAL EXAMINATION W/OUT ABNORMAL FINDINGS: ICD-10-CM

## 2023-07-11 DIAGNOSIS — J30.9 ALLERGIC RHINITIS, UNSPECIFIED: ICD-10-CM

## 2023-07-11 DIAGNOSIS — K58.9 IRRITABLE BOWEL SYNDROME W/OUT DIARRHEA: ICD-10-CM

## 2023-07-11 DIAGNOSIS — E05.90 THYROTOXICOSIS, UNSPECIFIED W/OUT THYROTOXIC CRISIS OR STORM: ICD-10-CM

## 2023-07-11 DIAGNOSIS — E78.5 HYPERLIPIDEMIA, UNSPECIFIED: ICD-10-CM

## 2023-07-11 PROCEDURE — 99396 PREV VISIT EST AGE 40-64: CPT | Mod: 25

## 2023-07-11 PROCEDURE — 93000 ELECTROCARDIOGRAM COMPLETE: CPT

## 2023-07-11 PROCEDURE — 82270 OCCULT BLOOD FECES: CPT

## 2023-07-11 RX ORDER — B-COMPLEX WITH VITAMIN C
TABLET ORAL DAILY
Qty: 90 | Refills: 3 | Status: ACTIVE | COMMUNITY
Start: 2023-07-11

## 2023-07-11 RX ORDER — PROPYLTHIOURACIL 50 MG/1
50 TABLET ORAL
Refills: 0 | Status: ACTIVE | COMMUNITY
Start: 2022-11-19

## 2023-07-12 PROBLEM — E05.90 HYPERTHYROIDISM: Status: ACTIVE | Noted: 2022-06-28

## 2023-07-12 PROBLEM — J30.9 ALLERGIC RHINITIS: Status: ACTIVE | Noted: 2023-07-12

## 2023-07-12 PROBLEM — K58.9 IBS (IRRITABLE BOWEL SYNDROME): Status: ACTIVE | Noted: 2018-08-07

## 2023-07-12 PROBLEM — E78.5 HYPERLIPIDEMIA: Status: ACTIVE | Noted: 2018-08-07

## 2023-07-12 NOTE — HISTORY OF PRESENT ILLNESS
[FreeTextEntry1] : Pt presented for PE.  Last PE was in 6/2022. [de-identified] : Pt was diagnosed with hyperthyroidism over the past year.  She is seeing endocrinologist every 3 months, she is now on PTU 25 mg BID and is doing well, she did not feel that good when she was on the higher dose of 50 mg TID.\par \par She had C diff infection in the fall, she was treated and recovered completely.\par \par She also saw allergist, and had allergy testing.  Her allergy symptoms are not bad, so does not take any meds.

## 2023-07-12 NOTE — DATA REVIEWED
[FreeTextEntry1] : Pelvic US - 12/2021\par Derm - summer 2021\par \par EKG - NSR, R - 61, axis - 70,  no STTW abnormality.  Prior EKG not available for comparison. \par \par Labs 7/3/2023 reviewed -\par * Chol - 189, HDL - 75, LDL - 105, TG - 47,\par * TSH - 2.23, the rest of TFTs were normal,\par * the rest of labs were unremarkable.

## 2023-07-12 NOTE — ASSESSMENT
[FreeTextEntry1] : Pt is UTD with mammogram and needs to f/u with GYN for Pap smear.  She was reminded to have routine eye exam, dental care and skin exam with dermatologist.

## 2023-07-12 NOTE — PHYSICAL EXAM
[No Acute Distress] : no acute distress [Well Nourished] : well nourished [Well Developed] : well developed [Normal Sclera/Conjunctiva] : normal sclera/conjunctiva [PERRL] : pupils equal round and reactive to light [EOMI] : extraocular movements intact [Normal Outer Ear/Nose] : the outer ears and nose were normal in appearance [Normal Oropharynx] : the oropharynx was normal [Normal TMs] : both tympanic membranes were normal [Normal Nasal Mucosa] : the nasal mucosa was normal [No JVD] : no jugular venous distention [No Lymphadenopathy] : no lymphadenopathy [Supple] : supple [No Respiratory Distress] : no respiratory distress  [Clear to Auscultation] : lungs were clear to auscultation bilaterally [Normal Rate] : normal rate  [Regular Rhythm] : with a regular rhythm [Normal S1, S2] : normal S1 and S2 [No Carotid Bruits] : no carotid bruits [Pedal Pulses Present] : the pedal pulses are present [No Edema] : there was no peripheral edema [No Extremity Clubbing/Cyanosis] : no extremity clubbing/cyanosis [Normal Appearance] : normal in appearance [No Masses] : no palpable masses [No Nipple Discharge] : no nipple discharge [No Axillary Lymphadenopathy] : no axillary lymphadenopathy [Soft] : abdomen soft [Non Tender] : non-tender [Non-distended] : non-distended [Normal Bowel Sounds] : normal bowel sounds [No CVA Tenderness] : no CVA  tenderness [No Spinal Tenderness] : no spinal tenderness [No Joint Swelling] : no joint swelling [Grossly Normal Strength/Tone] : grossly normal strength/tone [No Rash] : no rash [Coordination Grossly Intact] : coordination grossly intact [No Focal Deficits] : no focal deficits [Normal Gait] : normal gait [Speech Grossly Normal] : speech grossly normal [Normal Affect] : the affect was normal [Alert and Oriented x3] : oriented to person, place, and time [Normal Mood] : the mood was normal [Normal Sphincter Tone] : normal sphincter tone [No Mass] : no mass [Normal Supraclavicular Nodes] : no supraclavicular lymphadenopathy [Normal Axillary Nodes] : no axillary lymphadenopathy [Normal Posterior Cervical Nodes] : no posterior cervical lymphadenopathy [Normal Anterior Cervical Nodes] : no anterior cervical lymphadenopathy [Stool Occult Blood] : stool negative for occult blood [de-identified] : female in stated age,

## 2023-07-12 NOTE — HEALTH RISK ASSESSMENT
[Very Good] : ~his/her~ current health as very good [Good] : ~his/her~  mood as  good [Yes] : Yes [Monthly or less (1 pt)] : Monthly or less (1 point) [1 or 2 (0 pts)] : 1 or 2 (0 points) [Never (0 pts)] : Never (0 points) [No] : In the past 12 months have you used drugs other than those required for medical reasons? No [No falls in past year] : Patient reported no falls in the past year [0] : 2) Feeling down, depressed, or hopeless: Not at all (0) [PHQ-2 Negative - No further assessment needed] : PHQ-2 Negative - No further assessment needed [None] : None [With Family] : lives with family [# of Members in Household ___] :  household currently consist of [unfilled] member(s) [Employed] : employed [College] : College [] :  [# Of Children ___] : has [unfilled] children [Feels Safe at Home] : Feels safe at home [Fully functional (bathing, dressing, toileting, transferring, walking, feeding)] : Fully functional (bathing, dressing, toileting, transferring, walking, feeding) [Fully functional (using the telephone, shopping, preparing meals, housekeeping, doing laundry, using] : Fully functional and needs no help or supervision to perform IADLs (using the telephone, shopping, preparing meals, housekeeping, doing laundry, using transportation, managing medications and managing finances) [Smoke Detector] : smoke detector [Carbon Monoxide Detector] : carbon monoxide detector [Seat Belt] :  uses seat belt [Never] : Never [Audit-CScore] : 1 [de-identified] : very active at work, no formal exercise, [XAM2Rxmxp] : 0 [EyeExamDate] : 05/23 [Change in mental status noted] : No change in mental status noted [Reports changes in hearing] : Reports no changes in hearing [Reports changes in vision] : Reports no changes in vision [Reports changes in dental health] : Reports no changes in dental health [MammogramDate] : 11/22 [MammogramComments] : US breast -  11/2022 [PapSmearDate] : 07/21 [ColonoscopyComments] : EGD - 11/2020 [de-identified] : dentist -6/2023, every 4 months

## 2023-07-12 NOTE — REVIEW OF SYSTEMS
[Dryness] : dryness  [Negative] : Heme/Lymph [Fever] : no fever [Chills] : no chills [Fatigue] : no fatigue [Recent Change In Weight] : ~T no recent weight change [Chest Pain] : no chest pain [Palpitations] : no palpitations [Lower Ext Edema] : no lower extremity edema [Shortness Of Breath] : no shortness of breath [Wheezing] : no wheezing [Cough] : no cough [Dyspnea on Exertion] : no dyspnea on exertion [Abdominal Pain] : no abdominal pain [Nausea] : no nausea [Constipation] : no constipation [Diarrhea] : diarrhea [Vomiting] : no vomiting [Heartburn] : no heartburn [Melena] : no melena [Dysuria] : no dysuria [Incontinence] : no incontinence [Nocturia] : no nocturia [Hematuria] : no hematuria [Joint Pain] : no joint pain [Joint Stiffness] : no joint stiffness [Joint Swelling] : no joint swelling [Muscle Pain] : no muscle pain [Back Pain] : no back pain [Itching] : no itching [Mole Changes] : no mole changes [Skin Rash] : no skin rash [Headache] : no headache [Dizziness] : no dizziness [Fainting] : no fainting [Unsteady Walking] : no ataxia [Anxiety] : no anxiety [Insomnia] : no insomnia [Depression] : no depression [Easy Bruising] : no easy bruising [Easy Bleeding] : no easy bleeding [Swollen Glands] : no swollen glands

## 2024-07-02 ENCOUNTER — APPOINTMENT (OUTPATIENT)
Dept: INTERNAL MEDICINE | Facility: CLINIC | Age: 44
End: 2024-07-02

## 2024-10-22 ENCOUNTER — APPOINTMENT (OUTPATIENT)
Dept: INTERNAL MEDICINE | Facility: CLINIC | Age: 44
End: 2024-10-22
Payer: COMMERCIAL

## 2024-10-22 ENCOUNTER — NON-APPOINTMENT (OUTPATIENT)
Age: 44
End: 2024-10-22

## 2024-10-22 VITALS
WEIGHT: 110 LBS | SYSTOLIC BLOOD PRESSURE: 114 MMHG | DIASTOLIC BLOOD PRESSURE: 75 MMHG | HEIGHT: 65 IN | HEART RATE: 53 BPM | BODY MASS INDEX: 18.33 KG/M2 | RESPIRATION RATE: 15 BRPM | TEMPERATURE: 98.2 F | OXYGEN SATURATION: 98 %

## 2024-10-22 VITALS — SYSTOLIC BLOOD PRESSURE: 106 MMHG | DIASTOLIC BLOOD PRESSURE: 72 MMHG

## 2024-10-22 DIAGNOSIS — J30.9 ALLERGIC RHINITIS, UNSPECIFIED: ICD-10-CM

## 2024-10-22 DIAGNOSIS — Z87.898 PERSONAL HISTORY OF OTHER SPECIFIED CONDITIONS: ICD-10-CM

## 2024-10-22 DIAGNOSIS — M79.604 PAIN IN RIGHT LEG: ICD-10-CM

## 2024-10-22 DIAGNOSIS — Z00.00 ENCOUNTER FOR GENERAL ADULT MEDICAL EXAMINATION W/OUT ABNORMAL FINDINGS: ICD-10-CM

## 2024-10-22 DIAGNOSIS — K58.9 IRRITABLE BOWEL SYNDROME, UNSPECIFIED: ICD-10-CM

## 2024-10-22 DIAGNOSIS — Z01.818 ENCOUNTER FOR OTHER PREPROCEDURAL EXAMINATION: ICD-10-CM

## 2024-10-22 DIAGNOSIS — E78.5 HYPERLIPIDEMIA, UNSPECIFIED: ICD-10-CM

## 2024-10-22 DIAGNOSIS — E05.90 THYROTOXICOSIS, UNSPECIFIED W/OUT THYROTOXIC CRISIS OR STORM: ICD-10-CM

## 2024-10-22 DIAGNOSIS — L50.8 OTHER URTICARIA: ICD-10-CM

## 2024-10-22 DIAGNOSIS — Z11.52 ENCOUNTER FOR SCREENING FOR COVID-19: ICD-10-CM

## 2024-10-22 PROCEDURE — 99396 PREV VISIT EST AGE 40-64: CPT

## 2024-10-22 PROCEDURE — 99213 OFFICE O/P EST LOW 20 MIN: CPT | Mod: 25

## 2024-10-22 PROCEDURE — 93000 ELECTROCARDIOGRAM COMPLETE: CPT

## 2024-10-22 PROCEDURE — 82270 OCCULT BLOOD FECES: CPT

## 2024-10-23 PROBLEM — M79.604 LEG PAIN, DIFFUSE, RIGHT: Status: RESOLVED | Noted: 2022-03-31 | Resolved: 2024-10-23

## 2024-10-23 PROBLEM — Z87.898 HISTORY OF HEARTBURN: Status: RESOLVED | Noted: 2020-10-12 | Resolved: 2024-10-23

## 2024-10-23 PROBLEM — L50.8 ACUTE URTICARIA: Status: RESOLVED | Noted: 2022-08-23 | Resolved: 2024-10-23

## 2024-10-23 PROBLEM — Z01.818 PREOP TESTING: Status: RESOLVED | Noted: 2020-10-29 | Resolved: 2024-10-23
